# Patient Record
Sex: FEMALE | URBAN - METROPOLITAN AREA
[De-identification: names, ages, dates, MRNs, and addresses within clinical notes are randomized per-mention and may not be internally consistent; named-entity substitution may affect disease eponyms.]

---

## 2017-04-01 ENCOUNTER — TRANSFERRED RECORDS (OUTPATIENT)
Dept: HEALTH INFORMATION MANAGEMENT | Facility: CLINIC | Age: 22
End: 2017-04-01

## 2017-04-01 LAB
CHLAMYDIA - HIM PATIENT REPORTED: NEGATIVE
PAP SMEAR - HIM PATIENT REPORTED: NEGATIVE

## 2017-06-09 ENCOUNTER — OFFICE VISIT (OUTPATIENT)
Dept: FAMILY MEDICINE | Facility: CLINIC | Age: 22
End: 2017-06-09
Payer: COMMERCIAL

## 2017-06-09 ENCOUNTER — RADIANT APPOINTMENT (OUTPATIENT)
Dept: GENERAL RADIOLOGY | Facility: CLINIC | Age: 22
End: 2017-06-09
Attending: INTERNAL MEDICINE
Payer: COMMERCIAL

## 2017-06-09 VITALS
SYSTOLIC BLOOD PRESSURE: 143 MMHG | HEIGHT: 64 IN | BODY MASS INDEX: 42.51 KG/M2 | HEART RATE: 82 BPM | DIASTOLIC BLOOD PRESSURE: 106 MMHG | OXYGEN SATURATION: 93 % | WEIGHT: 249 LBS | TEMPERATURE: 98.9 F

## 2017-06-09 DIAGNOSIS — E66.01 MORBID OBESITY DUE TO EXCESS CALORIES (H): ICD-10-CM

## 2017-06-09 DIAGNOSIS — M25.552 PAIN OF LEFT HIP JOINT: Primary | ICD-10-CM

## 2017-06-09 DIAGNOSIS — M25.552 PAIN OF LEFT HIP JOINT: ICD-10-CM

## 2017-06-09 DIAGNOSIS — I10 BENIGN ESSENTIAL HYPERTENSION: ICD-10-CM

## 2017-06-09 PROCEDURE — 99214 OFFICE O/P EST MOD 30 MIN: CPT | Mod: 25 | Performed by: INTERNAL MEDICINE

## 2017-06-09 PROCEDURE — 96372 THER/PROPH/DIAG INJ SC/IM: CPT | Performed by: INTERNAL MEDICINE

## 2017-06-09 PROCEDURE — 73502 X-RAY EXAM HIP UNI 2-3 VIEWS: CPT

## 2017-06-09 RX ORDER — METHYLPHENIDATE HYDROCHLORIDE 36 MG/1
72 TABLET ORAL
COMMUNITY
Start: 2015-08-14

## 2017-06-09 RX ORDER — KETOROLAC TROMETHAMINE 30 MG/ML
60 INJECTION, SOLUTION INTRAMUSCULAR; INTRAVENOUS ONCE
Qty: 2 ML | Refills: 0 | OUTPATIENT
Start: 2017-06-09 | End: 2017-06-09

## 2017-06-09 RX ORDER — NAPROXEN 500 MG/1
500 TABLET ORAL 2 TIMES DAILY PRN
Qty: 30 TABLET | Refills: 1 | Status: SHIPPED | OUTPATIENT
Start: 2017-06-09 | End: 2018-12-12

## 2017-06-09 RX ORDER — LISINOPRIL 20 MG/1
20 TABLET ORAL
COMMUNITY
Start: 2017-02-13

## 2017-06-09 NOTE — PROGRESS NOTES
SUBJECTIVE:                                                    Jane Melara is a 21 year old female who presents to clinic today for the following health issues:      Joint Pain     Onset: one week     Description:   Location: left hip   Character: Sharp    Intensity: severe    Progression of Symptoms: same    Accompanying Signs & Symptoms:  Other symptoms: tingling in left leg    History:   Previous similar pain: no       Precipitating factors:   Trauma or overuse: no     Alleviating factors:  Improved by: nothing       Therapies Tried and outcome: heat, ice, Advil, muscle relaxer      Had a massage at [facility] the [facility] two weeks ago. 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 The therapist worked a little harder on the hip and Jane was doing well until about a week ago she had sudden onset of pain in her left hip. She has been taking Advil regularly, and applying ice and heat, and using muscle relaxants. This pain is different than her usual hip pain. Flexing her hip is extremely painful for her. The pain is located primarily on the most lateral aspect of her hip and occasionally radiates downward with certain movemements.       Problem list and histories reviewed & adjusted, as indicated.  Additional history: as documented    Patient Active Problem List   Diagnosis     Hip joint pain     No past surgical history on file.    Social History   Substance Use Topics     Smoking status: Never Smoker     Smokeless tobacco: Never Used     Alcohol use Yes     No family history on file.        Reviewed and updated as needed this visit by clinical staff       Reviewed and updated as needed this visit by Provider         ROS:  Constitutional, HEENT, cardiovascular, pulmonary, gi and gu systems are negative, except as otherwise noted.    OBJECTIVE:                                                    BP (!) 143/106 (BP Location: Right arm, Cuff Size: Adult  "Large)  Pulse 82  Temp 98.9  F (37.2  C) (Oral)  Ht 5' 3.5\" (1.613 m)  Wt 249 lb (112.9 kg)  LMP 05/28/2017  SpO2 93%  BMI 43.42 kg/m2  Body mass index is 43.42 kg/(m^2).  GENERAL: healthy, alert and no distress  NECK: no adenopathy, no asymmetry, masses, or scars and thyroid normal to palpation  RESP: lungs clear to auscultation - no rales, rhonchi or wheezes  CV: regular rate and rhythm, normal S1 S2, no S3 or S4, no murmur, click or rub, no peripheral edema and peripheral pulses strong  MS: Tenderness with palpation to the greater trochanter, external rotation causes pain, internal rotation causes pain, flexion at the hip also causes pain.   NEURO: Normal strength and tone, mentation intact and speech normal    Diagnostic Test Results:  XR Hip Left 2-3 Views -   Impression             IMPRESSION: Normal alignment of the left hip with preserved joint  space. No fractures are seen. Benign bone island in the  intertrochanteric left femur.          ASSESSMENT/PLAN:                                                      1. Pain of left hip joint  Could be bursitis. Patient stands on her feet all day and has baseline hip dysfunction. Toradol injection given in clinic today. Recommending Aleve BID for the next 1-2 weeks. If no improvement will have her follow up with ortho for consideration of intra-bursal injection.   - XR Hip Left 2-3 Views; Future    2. Morbid obesity due to excess calories (H)  Discussed the importance of weight loss on improving her hip function.     3. Benign essential hypertension  Patient did not take her Lisinopril this morning, hence her BP is high. Will have her return in 2 weeks for a BP check.       Follow up if no improvement in symptoms      Li Posada MD  [facility] [place]    "

## 2017-06-09 NOTE — MR AVS SNAPSHOT
"              After Visit Summary   6/9/2017    Jane Melara    MRN: [id number]           Patient Information     Date Of Birth          1995        Visit Information        Provider Department      6/9/2017 7:20 AM Li Posada MD [facility]        Today's Diagnoses     Pain of left hip joint    -  1    Morbid obesity due to excess calories (H)        Benign essential hypertension           Follow-ups after your visit        Your next 10 appointments already scheduled     Jun 23, 2017  9:00 AM CDT   Nurse Only with EC MA/LPN   [facility] ([facility])    [address] [place] [id number]   [phone]              Who to contact     If you have questions or need follow up information about today's clinic visit or your schedule please contact [facility] directly at [phone].  Normal or non-critical lab and imaging results will be communicated to you by Kind[employer], letter or phone within 4 business days after the clinic has received the results. If you do not hear from us within 7 days, please contact the clinic through Kindar[employer] or phone. If you have a critical or abnormal lab result, we will notify you by phone as soon as possible.  Submit refill requests through [employer] or call your pharmacy and they will forward the refill request to us. Please allow 3 business days for your refill to be completed.          Additional Information About Your Visit        Kindarahart Information     [employer] lets you send messages to your doctor, view your test results, renew your prescriptions, schedule appointments and more. To sign up, go to [email or web address] . Click on \"Log in\" on the left side of the screen, which will take you to the Welcome page. Then click on \"Sign up Now\" on the right side of the page.     You will be asked to enter the access code listed below, as well as some personal information. Please follow the " "directions to create your username and password.     Your access code is: [email or web address]  Expires: 2017 10:01 AM     Your access code will  in 90 days. If you need help or a new code, please call your [facility] or [phone].        Care EveryWhere ID     This is your Care EveryWhere ID. This could be used by other organizations to access your [facility] medical records  [id number]        Your Vitals Were     Pulse Temperature Height Last Period Pulse Oximetry BMI (Body Mass Index)    82 98.9  F (37.2  C) (Oral) 5' 3.5\" (1.613 m) 2017 93% 43.42 kg/m2       Blood Pressure from Last 3 Encounters:   17 (!) 143/106    Weight from Last 3 Encounters:   17 249 lb (112.9 kg)                 Today's Medication Changes          These changes are accurate as of: 17 10:01 AM.  If you have any questions, ask your nurse or doctor.               Start taking these medicines.        Dose/Directions    ketorolac 60 MG/2ML Soln injection   Commonly known as:  TORADOL   Used for:  Pain of left hip joint   Started by:  Li Posada MD        Dose:  60 mg   Inject 2 mLs (60 mg) into the muscle once for 1 dose   Quantity:  2 mL   Refills:  0       naproxen 500 MG tablet   Commonly known as:  NAPROSYN   Used for:  Pain of left hip joint   Started by:  Li Posada MD        Dose:  500 mg   Take 1 tablet (500 mg) by mouth 2 times daily as needed for moderate pain   Quantity:  30 tablet   Refills:  1            Where to get your medicines      These medications were sent to "[id number] - [place], [place] - [address] AT [facility] [address] [place] [address]  [address], [place] [address]    Hours:  24-hours Phone:  [phone]     naproxen 500 MG tablet         Some of these will need a paper prescription and others can be bought over the counter.  Ask your nurse if you have questions.     You don't need a prescription for these medications     ketorolac 60 MG/2ML " Soln injection                Primary Care Provider Office Phone # Fax #    Li Posada [phone]       [facility] [place] [address] [place] [address] [address]  [place] [address]        Thank you!     Thank you for choosing [facility] [place]  for your care. Our goal is always to provide you with excellent care. Hearing back from our patients is one way we can continue to improve our services. Please take a few minutes to complete the written survey that you may receive in the mail after your visit with us. Thank you!             Your Updated Medication List - Protect others around you: Learn how to safely use, store and throw away your medicines at www.disposemymeds.org.          This list is accurate as of: 6/9/17 10:01 AM.  Always use your most recent med list.                   Brand Name Dispense Instructions for use    ketorolac 60 MG/2ML Soln injection    TORADOL    2 mL    Inject 2 mLs (60 mg) into the muscle once for 1 dose       lisinopril 20 MG tablet    PRINIVIL/ZESTRIL     Take 20 mg by mouth       LOW-OGESTREL 0.3-30 MG-MCG per tablet   Generic drug:  norgestrel-ethinyl estradiol          methylphenidate ER 36 MG CR tablet    CONCERTA     Take 72 mg by mouth       naproxen 500 MG tablet    NAPROSYN    30 tablet    Take 1 tablet (500 mg) by mouth 2 times daily as needed for moderate pain

## 2017-06-09 NOTE — LETTER
[facility]          [address] [place] [address]                            [phone]  Fax: [phone]    Jane Melara  [address] [facility] [address]    [id number]    June 9, 2017      To whom it may concern    Please excuse Jane Melara from work on June 9th due to injury.  If you have any other questions or concerns please feel free to contact me at anytime.        Sincerely,        Leti Posada MD

## 2017-06-09 NOTE — Clinical Note
Please abstract the following data from this visit with this patient into the appropriate field in Epic:  Pap smear done on this date: 4/2017 (approximately), by this group: [facility]  results were normal. chlamydia  screening normal 4/2017

## 2018-12-12 ENCOUNTER — TELEPHONE (OUTPATIENT)
Dept: FAMILY MEDICINE | Facility: CLINIC | Age: 23
End: 2018-12-12

## 2018-12-12 ENCOUNTER — OFFICE VISIT (OUTPATIENT)
Dept: FAMILY MEDICINE | Facility: CLINIC | Age: 23
End: 2018-12-12

## 2018-12-12 VITALS
HEART RATE: 101 BPM | TEMPERATURE: 98.5 F | WEIGHT: 267 LBS | SYSTOLIC BLOOD PRESSURE: 132 MMHG | OXYGEN SATURATION: 95 % | BODY MASS INDEX: 46.56 KG/M2 | DIASTOLIC BLOOD PRESSURE: 88 MMHG

## 2018-12-12 DIAGNOSIS — Z11.3 SCREENING EXAMINATION FOR VENEREAL DISEASE: ICD-10-CM

## 2018-12-12 DIAGNOSIS — R07.0 THROAT PAIN: ICD-10-CM

## 2018-12-12 DIAGNOSIS — R82.90 ABNORMAL FINDING IN URINE: ICD-10-CM

## 2018-12-12 DIAGNOSIS — R30.0 DYSURIA: ICD-10-CM

## 2018-12-12 DIAGNOSIS — Z11.4 SCREENING FOR HIV (HUMAN IMMUNODEFICIENCY VIRUS): ICD-10-CM

## 2018-12-12 DIAGNOSIS — R10.32 ABDOMINAL PAIN, LEFT LOWER QUADRANT: Primary | ICD-10-CM

## 2018-12-12 LAB
ALBUMIN UR-MCNC: 30 MG/DL
APPEARANCE UR: ABNORMAL
BACTERIA #/AREA URNS HPF: ABNORMAL /HPF
BETA HCG QUAL IFA URINE: NEGATIVE
BILIRUB UR QL STRIP: NEGATIVE
COLOR UR AUTO: YELLOW
DEPRECATED S PYO AG THROAT QL EIA: NORMAL
ERYTHROCYTE [DISTWIDTH] IN BLOOD BY AUTOMATED COUNT: 12.6 % (ref 10–15)
GLUCOSE UR STRIP-MCNC: NEGATIVE MG/DL
HCT VFR BLD AUTO: 41.4 % (ref 35–47)
HGB BLD-MCNC: 14 G/DL (ref 11.7–15.7)
HGB UR QL STRIP: ABNORMAL
KETONES UR STRIP-MCNC: NEGATIVE MG/DL
LEUKOCYTE ESTERASE UR QL STRIP: ABNORMAL
MCH RBC QN AUTO: 31 PG (ref 26.5–33)
MCHC RBC AUTO-ENTMCNC: 33.8 G/DL (ref 31.5–36.5)
MCV RBC AUTO: 92 FL (ref 78–100)
NITRATE UR QL: NEGATIVE
NON-SQ EPI CELLS #/AREA URNS LPF: ABNORMAL /LPF
PH UR STRIP: 6 PH (ref 5–7)
PLATELET # BLD AUTO: 213 10E9/L (ref 150–450)
RBC # BLD AUTO: 4.51 10E12/L (ref 3.8–5.2)
RBC #/AREA URNS AUTO: ABNORMAL /HPF
SOURCE: ABNORMAL
SP GR UR STRIP: 1.01 (ref 1–1.03)
SPECIMEN SOURCE: NORMAL
UROBILINOGEN UR STRIP-ACNC: 1 EU/DL (ref 0.2–1)
WBC # BLD AUTO: 15.5 10E9/L (ref 4–11)
WBC #/AREA URNS AUTO: ABNORMAL /HPF

## 2018-12-12 PROCEDURE — 99214 OFFICE O/P EST MOD 30 MIN: CPT | Performed by: FAMILY MEDICINE

## 2018-12-12 PROCEDURE — 84703 CHORIONIC GONADOTROPIN ASSAY: CPT | Performed by: FAMILY MEDICINE

## 2018-12-12 PROCEDURE — 87591 N.GONORRHOEAE DNA AMP PROB: CPT | Performed by: FAMILY MEDICINE

## 2018-12-12 PROCEDURE — 87491 CHLMYD TRACH DNA AMP PROBE: CPT | Performed by: FAMILY MEDICINE

## 2018-12-12 PROCEDURE — 87086 URINE CULTURE/COLONY COUNT: CPT | Performed by: FAMILY MEDICINE

## 2018-12-12 PROCEDURE — 81001 URINALYSIS AUTO W/SCOPE: CPT | Performed by: FAMILY MEDICINE

## 2018-12-12 PROCEDURE — 85027 COMPLETE CBC AUTOMATED: CPT | Performed by: FAMILY MEDICINE

## 2018-12-12 PROCEDURE — 87880 STREP A ASSAY W/OPTIC: CPT | Performed by: FAMILY MEDICINE

## 2018-12-12 PROCEDURE — 87389 HIV-1 AG W/HIV-1&-2 AB AG IA: CPT | Performed by: FAMILY MEDICINE

## 2018-12-12 PROCEDURE — 36415 COLL VENOUS BLD VENIPUNCTURE: CPT | Performed by: FAMILY MEDICINE

## 2018-12-12 PROCEDURE — 87081 CULTURE SCREEN ONLY: CPT | Performed by: FAMILY MEDICINE

## 2018-12-12 RX ORDER — CIPROFLOXACIN 500 MG/1
500 TABLET, FILM COATED ORAL 2 TIMES DAILY
Qty: 14 TABLET | Refills: 0 | Status: SHIPPED | OUTPATIENT
Start: 2018-12-12 | End: 2018-12-22

## 2018-12-12 NOTE — PROGRESS NOTES
SUBJECTIVE:   Jane Melara is a 23 year old female who presents to clinic today for the following health issues:      Acute Illness   Acute illness concerns: fever and abd pain, headache  Onset: 3-4 days     Fever: no per pt up to 102     Chills/Sweats: YES    Headache (location?): YES    Sinus Pressure:no    Conjunctivitis:  no    Ear Pain: no    Rhinorrhea: no    Congestion: no    Sore Throat: no     Cough: no    Wheeze: no    Decreased Appetite: YES    Nausea: YES    Vomiting: no    Diarrhea:  no    Dysuria/Freq.: no    Fatigue/Achiness: YES    Sick/Strep Exposure: no     Therapies Tried and outcome: Ibuprofen   Patient complains of abdominal discomfort which comes and goes.  Some back discomfort along with that.  She has one episode of fever chills but it has improved.  Denies any other symptoms no nausea no vomiting.  She denies being having any urinary symptoms.        Problem list and histories reviewed & adjusted, as indicated.  Additional history: as documented    Patient Active Problem List   Diagnosis     Hip joint pain     Morbid obesity (H)     Benign essential hypertension     No past surgical history on file.    Social History     Tobacco Use     Smoking status: Never Smoker     Smokeless tobacco: Never Used   Substance Use Topics     Alcohol use: Yes     No family history on file.      Current Outpatient Medications   Medication Sig Dispense Refill     ciprofloxacin (CIPRO) 500 MG tablet Take 1 tablet (500 mg) by mouth 2 times daily for 10 days 14 tablet 0     lisinopril (PRINIVIL/ZESTRIL) 20 MG tablet Take 20 mg by mouth       methylphenidate ER (CONCERTA) 36 MG CR tablet Take 72 mg by mouth       norgestrel-ethinyl estradiol (LOW-OGESTREL) 0.3-30 MG-MCG per tablet          Reviewed and updated as needed this visit by clinical staff  Tobacco  Allergies  Meds       Reviewed and updated as needed this visit by Provider         ROS:  CONSTITUTIONAL: NEGATIVE for fever, chills, change in  weight  ENT/MOUTH: NEGATIVE for ear, mouth and throat problems  RESP: NEGATIVE for significant cough or SOB  CV: NEGATIVE for chest pain, palpitations or peripheral edema  GI: POSITIVE for abdominal pain suprapubic    OBJECTIVE:                                                    /88   Pulse 101   Temp 98.5  F (36.9  C) (Tympanic)   Wt 121.1 kg (267 lb)   LMP 11/21/2018 (Approximate)   SpO2 95%   BMI 46.56 kg/m    Body mass index is 46.56 kg/m .   GENERAL: healthy, alert, well nourished, well hydrated, no distress  HENT: ear canals- normal; TMs- normal; Nose- normal; Mouth- no ulcers, no lesions  NECK: no tenderness, no adenopathy, no asymmetry, no masses, no stiffness; thyroid- normal to palpation  RESP: lungs clear to auscultation - no rales, no rhonchi, no wheezes  CV: regular rates and rhythm, normal S1 S2, no S3 or S4 and no murmur, no click or rub -  ABDOMEN: soft, mild tenderness in supra pubic area, no  hepatosplenomegaly, no masses, normal bowel sounds         ASSESSMENT/PLAN:                                                        ICD-10-CM    1. Abdominal pain, left lower quadrant R10.32 NEISSERIA GONORRHOEA PCR     CHLAMYDIA TRACHOMATIS PCR     UA reflex to Microscopic     Beta HCG Qual, Urine - [facility] [place] ([id number])     CBC with platelets     Urine Microscopic   2. Screening examination for venereal disease Z11.3 NEISSERIA GONORRHOEA PCR     CHLAMYDIA TRACHOMATIS PCR   3. Screening for HIV (human immunodeficiency virus) Z11.4 HIV Screening   4. Throat pain R07.0 Rapid strep screen     Beta strep group A culture   5. Dysuria R30.0 ciprofloxacin (CIPRO) 500 MG tablet     Patient CBC shows elevated WBCs along with urine which has some white count elevation.  Her symptoms could be due to urinary tract infection.  I will order ciprofloxacin to be used.  Will order the culture as well if culture is negative she can finish antibiotic for at least 3 days.  STD screen also ordered she denies  any concerns though.  Warning signs were discussed with the patient.    Arron Newby MD  [facility]

## 2018-12-12 NOTE — TELEPHONE ENCOUNTER
Pt called and was running a temp of 102 last night. It is now 100 after ibuprofen. She is having abdominal and back pain. Appt scheduled at 11:20 with Dr Newby for today. Routing to triage.  Raquel Morton,

## 2018-12-12 NOTE — LETTER
To whom it may concern.      Jane Saritha      1995            Patient is seen in the clinic 12/12/2018. She is advised rest and hydration 12/12/2018                      Sincerely,         Arron Newby MD   12/12/2018

## 2018-12-12 NOTE — TELEPHONE ENCOUNTER
A couple days ago Jane was in pain on her left side and then it went to her back.  Fever 102 yesterday and 100 today.    No UTI symptoms.   Ok for appt at 11:20  Devi Erwin RN- Triage [employer]

## 2018-12-13 ENCOUNTER — TELEPHONE (OUTPATIENT)
Dept: FAMILY MEDICINE | Facility: CLINIC | Age: 23
End: 2018-12-13

## 2018-12-13 LAB
BACTERIA SPEC CULT: NORMAL
BACTERIA SPEC CULT: NORMAL
C TRACH DNA SPEC QL NAA+PROBE: NEGATIVE
HIV 1+2 AB+HIV1 P24 AG SERPL QL IA: NONREACTIVE
N GONORRHOEA DNA SPEC QL NAA+PROBE: NEGATIVE
SPECIMEN SOURCE: NORMAL

## 2018-12-13 NOTE — TELEPHONE ENCOUNTER
Note from pharmacy stating directions and quantity do not match.  Please call pharmacy to clarify. [phone]  ciprofloxacin (CIPRO) 500 MG tablet 14 tablet 0 12/12/2018 12/22/2018 --   Sig - Route: Take 1 tablet (500 mg) by mouth 2 times daily for 10 days - Oral   Sent to pharmacy as: ciprofloxacin (CIPRO) 500 MG tablet   Class: E-Prescribe

## 2018-12-13 NOTE — TELEPHONE ENCOUNTER
Please clarify quantity. Current quantity is for 7 days. Directions state to take for 10 days. Iris Groves RN

## 2018-12-13 NOTE — TELEPHONE ENCOUNTER
Spoke with pharmacist and informed.   Monica Garcia RN   [facility] - Triage

## 2019-04-09 ENCOUNTER — NURSE TRIAGE (OUTPATIENT)
Dept: NURSING | Facility: CLINIC | Age: 24
End: 2019-04-09

## 2019-04-09 NOTE — TELEPHONE ENCOUNTER
[facility] triage call :   Presenting problem : Pt called. Heavy  Vaginal bleeding is much worse than usual , usual period only last 5 day and by day 4 brown and liter amounts .  tampon is changed every    Period cramps are severe   Guideline used : vaginal bleeding - abnormal - adult .   Disposition and recommendations : have someone drive you to ED and remain NPO and call 911 if fainting or confused or too weak to stand , Pt will go to [facility] ED.   Caller verbalizes understanding and denies further questions and will call back if further symptoms to triage or questions  . Kandy Benson RN  - [place] Nurse Advisor     Reason for Disposition    SEVERE abdominal pain    Additional Information    Negative: Shock suspected (e.g., cold/pale/clammy skin, too weak to stand, low BP, rapid pulse)    Negative: Difficult to awaken or acting confused  (e.g., disoriented, slurred speech)    Negative: Passed out (i.e., lost consciousness, collapsed and was not responding)    Negative: Sounds like a life-threatening emergency to the triager    Negative: Followed a genital area injury    Negative: Pregnant > 20 weeks  (5 months or more)    Negative: Pregnant < 20 weeks  (less than 5 months)    Negative: Bleeding occurring > 12 months after menopause    Negative: Bleeding from sexual abuse or rape    Negative: [1] Vaginal discharge is main symptom AND [2] small amount of blood    Protocols used: VAGINAL BLEEDING - ABNORMAL-ADULT-